# Patient Record
Sex: FEMALE | Race: WHITE | Employment: OTHER | URBAN - METROPOLITAN AREA
[De-identification: names, ages, dates, MRNs, and addresses within clinical notes are randomized per-mention and may not be internally consistent; named-entity substitution may affect disease eponyms.]

---

## 2018-10-15 ENCOUNTER — THERAPY VISIT (OUTPATIENT)
Dept: PHYSICAL THERAPY | Facility: CLINIC | Age: 83
End: 2018-10-15
Payer: COMMERCIAL

## 2018-10-15 DIAGNOSIS — M54.50 LUMBAGO: Primary | ICD-10-CM

## 2018-10-15 PROCEDURE — G8979 MOBILITY GOAL STATUS: HCPCS | Mod: GP | Performed by: PHYSICAL THERAPIST

## 2018-10-15 PROCEDURE — G8978 MOBILITY CURRENT STATUS: HCPCS | Mod: GP | Performed by: PHYSICAL THERAPIST

## 2018-10-15 PROCEDURE — 97112 NEUROMUSCULAR REEDUCATION: CPT | Mod: GP | Performed by: PHYSICAL THERAPIST

## 2018-10-15 PROCEDURE — 97161 PT EVAL LOW COMPLEX 20 MIN: CPT | Mod: GP | Performed by: PHYSICAL THERAPIST

## 2018-10-15 PROCEDURE — 97110 THERAPEUTIC EXERCISES: CPT | Mod: GP | Performed by: PHYSICAL THERAPIST

## 2018-10-15 NOTE — MR AVS SNAPSHOT
After Visit Summary   10/15/2018    Beckie Rizvi    MRN: [id number]           Patient Information     Date Of Birth          3/17/1930        Visit Information        Provider Department      10/15/2018 3:20 PM [facility]        Today's Diagnoses     Lumbago    -  1       Follow-ups after your visit        Your next 10 appointments already scheduled     Oct 23, 2018  2:20 PM CDT   BERENICE Spine with Rosalia Rodriges PT   [facility] ([facility])    [address]  [place]. \Hospital [facility] and Outlook.\"" [id number] [phone]            Oct 30, 2018  2:20 PM CDT   BERENICE Spine with Rosalia Rodriges PT   [facility] ([facility])    [address] [facility] [address] [phone]              Who to contact     If you have questions or need follow up information about today's clinic visit or your schedule please contact [facility] directly at [phone].  Normal or non-critical lab and imaging results will be communicated to you by MyChart, letter or phone within 4 business days after the clinic has received the results. If you do not hear from us within 7 days, please contact the clinic through MyChart or phone. If you have a critical or abnormal lab result, we will notify you by phone as soon as possible.  Submit refill requests through [employer] or call your pharmacy and they will forward the refill request to us. Please allow 3 business days for your refill to be completed.          Additional Information About Your Visit        Care EveryWhere ID     This is your Care EveryWhere ID. This could be used by other organizations to access your [place] medical records  [id number]         Blood Pressure from Last 3 Encounters:   No data found for BP    Weight from Last 3 Encounters:   No data found for Wt               We Performed the Following     HC PT EVAL, LOW COMPLEXITY     BERENICE CERT REPORT     NEUROMUSCULAR RE-EDUCATION     THERAPEUTIC EXERCISES        Primary Care Provider Office Phone # Fax #    Liss Mack [phone]       [address] [address]        Equal Access to Services     [facility]: Hadii aad ku hadasho Soomaali, waaxda luqadaha, qaybta kaalmada adeegyada, waxay mikeyin hayaan shani hudsonblazeanshu lapritesh . So [facility] [phone].    ATENCIÓN: Si habla español, tiene a causey disposición servicios gratuitos de asistencia lingüística. Didierame al [phone].    We comply with applicable federal civil rights laws and [place] laws. We do not discriminate on the basis of race, color, national origin, age, disability, sex, sexual orientation, or gender identity.            Thank you!     Thank you for choosing [facility]  for your care. Our goal is always to provide you with excellent care. Hearing back from our patients is one way we can continue to improve our services. Please take a few minutes to complete the written survey that you may receive in the mail after your visit with us. Thank you!             Your Updated Medication List - Protect others around you: Learn how to safely use, store and throw away your medicines at www.disposemymeds.org.      Notice  As of 10/15/2018  9:55 PM    You have not been prescribed any medications.

## 2018-10-15 NOTE — LETTER
[place] [facility]  [address] [facility] [address]  [phone]    2018    Re: Beckie Rizvi   :   3/17/1930  MRN:  [id number]   REFERRING PHYSICIAN:   Liss Mack  [facility]  Date of Initial Evaluation:  10/15/2018  Visits:  Rxs Used: 1  Reason for Referral:  Lumbago  EVALUATION SUMMARY  [facility] Initial Evaluation  Subjective:  Patient is a 88 year old female presenting with rehab back hpi. The history is provided by the patient.   eBckie Rizvi is a 88 year old female with a lumbar condition.  Condition occurred with:  Insidious onset.  Condition occurred: for unknown reasons.  This is a recurrent and chronic condition  Pain began about 8 years ago. PT referral on 10/3/18.    Patient reports pain:  Lower lumbar spine.  Radiates to:  Gluteals right and gluteals left.  Pain is described as aching and is intermittent and reported as 6/10.  Associated symptoms:  Loss of motion/stiffness and loss of strength. Worse during: no pattern.  Symptoms are exacerbated by standing, walking, lying down and sitting and relieved by rest and heat.  Since onset symptoms are gradually worsening.  Special tests:  X-ray (stenosis per order).  Previous treatment includes physical therapy.  There was moderate improvement following previous treatment.  General health reported: pt did not rate.  Pertinent medical history includes:  Osteoporosis, kidney disease and high blood pressure.  Medical allergies: no.  Other surgeries include:  Other (hysterectomy).  Current medications:  High blood pressure medication, meds to increase bone density and pain medication.  Current occupation is Retired.      Barriers include:  None as reported by the patient.  Red flags:  None as reported by the patient.  Objective:  System  Lumbar/SI Evaluation  ROM:    AROM Lumbar:   Flexion:            *pain  Ext:                     Decreased 50% *pain   Side Bend:        Left:  WNL    Right:  WNL  Rotation:           Left:  Decreased 25% *pain    Right:  Decreased 25% *pain  Side Glide:        Left:     Right:   Lumbar Myotomes:  normal  Lumbar Dermtomes:  normal  Neural Tension/Mobility:    Left side:SLR or Slump  negative.   Right side:   Slump or SLR  negative.   Lumbar Palpation:    Tenderness present at Left:    Vertebral  Tenderness not present at Left:    Quadratus Lumborum; Piriformis or Gluteus Medius  Re: Beckie Rizvi   :   3/17/1930    Tenderness present at Right: Quadratus Lumborum; Piriformis; Gluteus Medius and Vertebral   Hip Evaluation  HIP AROM:  AROM:    Left Hip:     Normal    Right Hip:   Normal  Hip Strength:  : hip ext 4+/5, all others WNL. : hip abd 4/5, hip ext 4/5, all others WNL.  Hip Palpation:    Left hip tenderness not present at:  Greater Trachanter or IT Band  Right hip tenderness not present at:  Greater Trachanter or IT Band  Functional Testing:    Quad:    Bilateral leg squat:  Excessive anterior knee excursion and mild loss of control   General   ROS  Assessment/Plan:    Patient is a 88 year old female with lumbar complaints.    Patient has the following significant findings with corresponding treatment plan.                Diagnosis 1:  LBP  Pain -  hot/cold therapy, manual therapy, self management, education, directional preference exercise and home program  Decreased ROM/flexibility - manual therapy, therapeutic exercise and home program  Decreased strength - therapeutic exercise, therapeutic activities and home program  Therapy Evaluation Codes:   1) History comprised of:   Personal factors that impact the plan of care:      None.    Comorbidity factors that impact the plan of care are:      Osteoporosis.     Medications impacting care: None.  2) Examination of Body Systems comprised of:   Body structures and functions that impact the plan of care:      Hip, Lumbar spine, Pelvis and  Thoracic Spine.   Activity limitations that impact the plan of care are:      Bathing, Bending, Cooking, Reading/Computer work, Sitting, Squatting/kneeling, Stairs, Standing, Walking and Sleeping.  3) Clinical presentation characteristics are:   Stable/Uncomplicated.  4) Decision-Making    Low complexity using standardized patient assessment instrument and/or measureable assessment of functional outcome.  Cumulative Therapy Evaluation is: Low complexity.  Previous and current functional limitations:  (See Goal Flow Sheet for this information)    Short term and Long term goals: (See Goal Flow Sheet for this information)   Communication ability:  Patient appears to be able to clearly communicate and understand verbal and written communication and follow directions correctly.  Treatment Explanation - The following has been discussed with the patient:   RX ordered/plan of care  Anticipated outcomes  Possible risks and side effects  This patient would benefit from PT intervention to resume normal activities.   Re: Beckie Rizvi   :   3/17/1930        Rehab potential is good.    Frequency:  1 X week, once daily  Duration:  for 8 weeks  Discharge Plan:  Achieve all LTG.  Independent in home treatment program.  Reach maximal therapeutic benefit.        Thank you for your referral.    INQUIRIES  Therapist: Rosalia Rodriges PT DPT  [facility]  [address] [address]  Phone: [phone]  Fax: [phone]

## 2018-10-16 NOTE — PROGRESS NOTES
[place] [facility] Initial Evaluation  Subjective:  Patient is a 88 year old female presenting with rehab back hpi. The history is provided by the patient.   Beckie Rizvi is a 88 year old female with a lumbar condition.  Condition occurred with:  Insidious onset.  Condition occurred: for unknown reasons.  This is a recurrent and chronic condition  Pain began about 8 years ago. PT referral on 10/3/18.    Patient reports pain:  Lower lumbar spine.  Radiates to:  Gluteals right and gluteals left.  Pain is described as aching and is intermittent and reported as 6/10.  Associated symptoms:  Loss of motion/stiffness and loss of strength. Worse during: no pattern.  Symptoms are exacerbated by standing, walking, lying down and sitting and relieved by rest and heat.  Since onset symptoms are gradually worsening.  Special tests:  X-ray (stenosis per order).  Previous treatment includes physical therapy.  There was moderate improvement following previous treatment.  General health reported: pt did not rate.  Pertinent medical history includes:  Osteoporosis, kidney disease and high blood pressure.  Medical allergies: no.  Other surgeries include:  Other (hysterectomy).  Current medications:  High blood pressure medication, meds to increase bone density and pain medication.  Current occupation is Retired.        Barriers include:  None as reported by the patient.    Red flags:  None as reported by the patient.                        Objective:  System         Lumbar/SI Evaluation  ROM:    AROM Lumbar:   Flexion:            *pain  Ext:                    Decreased 50% *pain   Side Bend:        Left:  WNL    Right:  WNL  Rotation:           Left:  Decreased 25% *pain    Right:  Decreased 25% *pain  Side Glide:        Left:     Right:           Lumbar Myotomes:  normal                Lumbar Dermtomes:  normal                Neural Tension/Mobility:      Left side:SLR or Slump  negative.     Right side:   Slump or  SLR  negative.   Lumbar Palpation:    Tenderness present at Left:    Vertebral  Tenderness not present at Left:    Quadratus Lumborum; Piriformis or Gluteus Medius  Tenderness present at Right: Quadratus Lumborum; Piriformis; Gluteus Medius and Vertebral                                          Hip Evaluation  HIP AROM:  AROM:    Left Hip:     Normal    Right Hip:   Normal                    Hip Strength:  : hip ext 4+/5, all others WNL. : hip abd 4/5, hip ext 4/5, all others WNL.                            Hip Palpation:      Left hip tenderness not present at:  Greater Trachanter or IT Band    Right hip tenderness not present at:  Greater Trachanter or IT Band  Functional Testing:          Quad:      Bilateral leg squat:  Excessive anterior knee excursion and mild loss of control                  General     ROS    Assessment/Plan:    Patient is a 88 year old female with lumbar complaints.    Patient has the following significant findings with corresponding treatment plan.                Diagnosis 1:  LBP  Pain -  hot/cold therapy, manual therapy, self management, education, directional preference exercise and home program  Decreased ROM/flexibility - manual therapy, therapeutic exercise and home program  Decreased strength - therapeutic exercise, therapeutic activities and home program    Therapy Evaluation Codes:   1) History comprised of:   Personal factors that impact the plan of care:      None.    Comorbidity factors that impact the plan of care are:      Osteoporosis.     Medications impacting care: None.  2) Examination of Body Systems comprised of:   Body structures and functions that impact the plan of care:      Hip, Lumbar spine, Pelvis and Thoracic Spine.   Activity limitations that impact the plan of care are:      Bathing, Bending, Cooking, Reading/Computer work, Sitting, Squatting/kneeling, Stairs, Standing, Walking and Sleeping.  3) Clinical presentation characteristics  are:   Stable/Uncomplicated.  4) Decision-Making    Low complexity using standardized patient assessment instrument and/or measureable assessment of functional outcome.  Cumulative Therapy Evaluation is: Low complexity.    Previous and current functional limitations:  (See Goal Flow Sheet for this information)    Short term and Long term goals: (See Goal Flow Sheet for this information)     Communication ability:  Patient appears to be able to clearly communicate and understand verbal and written communication and follow directions correctly.  Treatment Explanation - The following has been discussed with the patient:   RX ordered/plan of care  Anticipated outcomes  Possible risks and side effects  This patient would benefit from PT intervention to resume normal activities.   Rehab potential is good.    Frequency:  1 X week, once daily  Duration:  for 8 weeks  Discharge Plan:  Achieve all LTG.  Independent in home treatment program.  Reach maximal therapeutic benefit.    Please refer to the daily flowsheet for treatment today, total treatment time and time spent performing 1:1 timed codes.

## 2018-10-23 ENCOUNTER — THERAPY VISIT (OUTPATIENT)
Dept: PHYSICAL THERAPY | Facility: CLINIC | Age: 83
End: 2018-10-23
Payer: COMMERCIAL

## 2018-10-23 DIAGNOSIS — M54.50 LUMBAGO: ICD-10-CM

## 2018-10-23 PROCEDURE — 97110 THERAPEUTIC EXERCISES: CPT | Mod: GP | Performed by: PHYSICAL THERAPIST

## 2018-10-23 PROCEDURE — 97140 MANUAL THERAPY 1/> REGIONS: CPT | Mod: GP | Performed by: PHYSICAL THERAPIST

## 2018-10-23 PROCEDURE — 97112 NEUROMUSCULAR REEDUCATION: CPT | Mod: GP | Performed by: PHYSICAL THERAPIST

## 2018-10-30 ENCOUNTER — THERAPY VISIT (OUTPATIENT)
Dept: PHYSICAL THERAPY | Facility: CLINIC | Age: 83
End: 2018-10-30
Payer: COMMERCIAL

## 2018-10-30 DIAGNOSIS — M54.50 LUMBAGO: ICD-10-CM

## 2018-10-30 PROCEDURE — 97110 THERAPEUTIC EXERCISES: CPT | Mod: GP | Performed by: PHYSICAL THERAPIST

## 2018-10-30 PROCEDURE — 97112 NEUROMUSCULAR REEDUCATION: CPT | Mod: GP | Performed by: PHYSICAL THERAPIST

## 2018-11-06 ENCOUNTER — THERAPY VISIT (OUTPATIENT)
Dept: PHYSICAL THERAPY | Facility: CLINIC | Age: 83
End: 2018-11-06
Payer: COMMERCIAL

## 2018-11-06 DIAGNOSIS — M54.50 LUMBAGO: ICD-10-CM

## 2018-11-06 PROCEDURE — 97140 MANUAL THERAPY 1/> REGIONS: CPT | Mod: GP | Performed by: PHYSICAL THERAPIST

## 2018-11-06 PROCEDURE — 97112 NEUROMUSCULAR REEDUCATION: CPT | Mod: GP | Performed by: PHYSICAL THERAPIST

## 2018-11-06 PROCEDURE — 97110 THERAPEUTIC EXERCISES: CPT | Mod: GP | Performed by: PHYSICAL THERAPIST

## 2018-11-06 NOTE — MR AVS SNAPSHOT
After Visit Summary   11/6/2018    Beckie Rizvi    MRN: [id number]           Patient Information     Date Of Birth          3/17/1930        Visit Information        Provider Department      11/6/2018 4:20 PM Rosalia Rodriges, PT [facility]        Today's Diagnoses     Lumbago           Follow-ups after your visit        Who to contact     If you have questions or need follow up information about today's clinic visit or your schedule please contact [facility] directly at [phone].  Normal or non-critical lab and imaging results will be communicated to you by MyChart, letter or phone within 4 business days after the clinic has received the results. If you do not hear from us within 7 days, please contact the clinic through MyChart or phone. If you have a critical or abnormal lab result, we will notify you by phone as soon as possible.  Submit refill requests through [employer] or call your pharmacy and they will forward the refill request to us. Please allow 3 business days for your refill to be completed.          Additional Information About Your Visit        Care EveryWhere ID     This is your Care EveryWhere ID. This could be used by other organizations to access your [place] medical records  [id number]         Blood Pressure from Last 3 Encounters:   No data found for BP    Weight from Last 3 Encounters:   No data found for Wt              We Performed the Following     MANUAL THER TECH,1+REGIONS,EA 15 MIN     NEUROMUSCULAR RE-EDUCATION     THERAPEUTIC EXERCISES        Primary Care Provider Office Phone # Fax #    Liss Mack [phone]       [address] [address]        Equal Access to Services     MADIHA KWAN : Hadii isatu Saba, waaxda luqadaha, qaybta kaalmada jay, breann cordova. So [facility] [phone].    ATENCIÓN: Si  talon collier, tiene a causey disposición servicios gratuitos de asistencia lingüística. Jo dow [phone].    We comply with applicable federal civil rights laws and [place] laws. We do not discriminate on the basis of race, color, national origin, age, disability, sex, sexual orientation, or gender identity.            Thank you!     Thank you for choosing [place] [facility]  for your care. Our goal is always to provide you with excellent care. Hearing back from our patients is one way we can continue to improve our services. Please take a few minutes to complete the written survey that you may receive in the mail after your visit with us. Thank you!             Your Updated Medication List - Protect others around you: Learn how to safely use, store and throw away your medicines at www.disposemymeds.org.      Notice  As of 11/6/2018 11:59 PM    You have not been prescribed any medications.

## 2019-08-20 PROBLEM — M54.50 LUMBAGO: Status: RESOLVED | Noted: 2018-10-15 | Resolved: 2019-08-20

## 2019-08-20 NOTE — PROGRESS NOTES
Subjective:  HPI  Oswestry Score: 0 %                 Objective:  System    Physical Exam    General     ROS    Assessment/Plan:    DISCHARGE REPORT    Progress reporting period is from 10/15/18 to 11/6/18.     SUBJECTIVE  Subjective: Pt states she was feeling 100% until the last day or so. Things the pain is d/t the weather, as her hands are hurting more as well. HEP going well.   Current Pain level: 2/10   Initial Pain level: 6/10   Changes in function: No changes noted in function since last SOAP note   Adverse reactions: None;   ,     Patient has failed to return to therapy so current objective findings are unknown.  The subjective and objective information are from the last SOAP note on this patient.    OBJECTIVE  Objective: HEP remains challenging. Tender to palpation R QL and glute med.      ASSESSMENT/PLAN  Diagnosis 1:  LBP  Pain -  hot/cold therapy, manual therapy, self management, education, directional preference exercise and home program  Decreased ROM/flexibility - manual therapy, therapeutic exercise and home program  Decreased strength - therapeutic exercise, therapeutic activities and home program  STG/LTGs have been met or progress has been made towards goals:  Yes (See Goal flow sheet completed today.)  Assessment of Progress: Patient is meeting short term goals and is progressing towards long term goals.  Self Management Plans:  Patient has been instructed in a home treatment program.  Patient  has been instructed in self management of symptoms.  Beckie continues to require the following intervention to meet STG and LTG's: PT intervention is no longer required to meet STG/LTG.  The patient failed to complete scheduled/ordered appointments so current information is unknown.  We will discharge this patient from PT.    Recommendations:  This patient is ready to be discharged from therapy and continue their home treatment program.    Please refer to the daily flowsheet for treatment today, total  treatment time and time spent performing 1:1 timed codes.